# Patient Record
(demographics unavailable — no encounter records)

---

## 2018-04-02 PROCEDURE — 82746 ASSAY OF FOLIC ACID SERUM: CPT | Performed by: FAMILY MEDICINE

## 2018-04-02 PROCEDURE — 82607 VITAMIN B-12: CPT | Performed by: FAMILY MEDICINE

## 2024-02-22 NOTE — PATIENT INSTRUCTIONS
After your visit at the [place] office  today, please direct any follow up questions or medication needs to the staff in our [place] office so that your concerns may be promptly addressed.  We are available through [employer] or at the numbers below:    The phone number is:   [phone], option 1    The fax number is:  [phone]    Your pharmacy should also send any requests electronically to the [place] office.       Refill policies:    Allow 2-3 business days for refills; controlled substances may take longer.  Contact your pharmacy at least 5 days prior to running out of medication and have them send an electronic request or submit request through the “request refill” option in your [employer] account.  Refills are not addressed on weekends; covering physicians do not authorize routine medications on weekends.  No narcotics or controlled substances are refilled after noon on Fridays or by on call physicians.  By law, narcotics must be electronically prescribed.  A 30 day supply with no refills is the maximum allowed.  If your prescription is due for a refill, you may be due for a follow up appointment.  To best provide you care, patients receiving routine medications need to be seen at least once a year.  Patients receiving narcotic/controlled substance medications need to be seen at least once every 3 months.  In the event that your preferred pharmacy does not have the requested medication in stock (e.g. Backordered), it is your responsibility to find another pharmacy that has the requested medication available.  We will gladly send a new prescription to that pharmacy at your request.    Scheduling Tests:    If your physician has ordered radiology tests such as MRI or CT scans, please contact Central Scheduling at [phone] right away to schedule the test.  Once scheduled, the [facility] Centralized Referral Team will work with your insurance carrier to obtain pre-certification or prior authorization.   Depending on your insurance carrier, approval may take 3-10 days.  It is highly recommended patients assure they have received an authorization before having a test performed.  If test is done without insurance authorization, patient may be responsible for the entire amount billed.      Precertification and Prior Authorizations:  If your physician has recommended that you have a procedure or additional testing performed the [facility] Centralized Referral Team will contact your insurance carrier to obtain pre-certification or prior authorization.    You are strongly encouraged to contact your insurance carrier to verify that your procedure/test has been approved and is a COVERED benefit.  Although the [facility] Centralized Referral Team does its due diligence, the insurance carrier gives the disclaimer that \"Although the procedure is authorized, this does not guarantee payment.\"    Ultimately the patient is responsible for payment.   Thank you for your understanding in this matter.  Paperwork Completion:  If you require FMLA or disability paperwork for your recovery, please make sure to either drop it off or have it faxed to our office at [phone]. Be sure the form has your name and date of birth on it.  The form will be faxed to our Forms Department and they will complete it for you.  There is a 25$ fee for all forms that need to be filled out.  Please be aware there is a 10-14 day turnaround time.  You will need to sign a release of information (SUSAN) form if your paperwork does not come with one.  You may call the Forms Department with any questions at [phone].  Their fax number is [phone].

## 2024-02-22 NOTE — PROGRESS NOTES
HPI:    Patient ID: Elif Varner is a 50 year old female.  PCP: Jerica Beck NP    HPI  Patient is a 50-year-old female with no significant past medical history presented for evaluation of for an MRI brain finding of a possible remote ischemia.  She reports she had an episode of blurred vision involving both eyes that lasted for about 1 to 2 hours without any associated eye pain or headache.  She was evaluated by her primary care and MRI of the brain and ophthalmology evaluation was recommended.  MRI of the brain done at [facility] shows a small area of clinical she has no known history of any TIAs or stroke.  States gets her migraines occasionally and has had migraines with visual aura, usually gets flashing lights  At baseline has decrease vision in left eye from previous pterygium surgery. Sees Ophthalmology and report from 12/8 shows early cataract both eyes and dry eye syndrome both eyes.  Remote history of head injury as a child and ? Brain imaging did shows some dried blood what she recalls from her parents.         HISTORY:  History reviewed. No pertinent past medical history.   Past Surgical History:   Procedure Laterality Date    OTHER      endometriosis      Family History   Problem Relation Age of Onset    Diabetes Father     Other (Other) Mother         hypotension    Psychiatric Daughter         depression    Cancer Maternal Grandmother         stomach ca.      Social History     Socioeconomic History    Marital status:    Tobacco Use    Smoking status: Never    Smokeless tobacco: Never   Vaping Use    Vaping Use: Never used   Substance and Sexual Activity    Alcohol use: Never    Drug use: Never   Other Topics Concern    Caffeine Concern Yes     Comment: 1 cupcoffee/day    Exercise No   Social History Narrative    ** Merged History Encounter **             Review of Systems   Constitutional: Negative.    HENT: Negative.     Eyes: Negative.    Respiratory: Negative.      Cardiovascular: Negative.    Gastrointestinal: Negative.    Endocrine: Negative.    Genitourinary: Negative.    Musculoskeletal: Negative.    Allergic/Immunologic: Negative.    Neurological: Negative.         + history of migraine   Hematological: Negative.    Psychiatric/Behavioral: Negative.     All other systems reviewed and are negative.           Current Outpatient Medications   Medication Sig Dispense Refill    EPINEPHrine 0.3 MG/0.3ML Injection Solution Auto-injector Inject 0.3 mL (1 each total) as directed As Directed.      Cholecalciferol (VITAMIN D) 50 MCG (2000 UT) Oral Cap Take 1 capsule (2,000 Units total) by mouth daily.      ferrous sulfate 325 (65 FE) MG Oral Tab EC Take 1 tablet (325 mg total) by mouth daily with breakfast.       Allergies:  Allergies   Allergen Reactions    Latex ITCHING    Sulfa Antibiotics ANAPHYLAXIS    Sulfa Antibiotics SHORTNESS OF BREATH     PHYSICAL EXAM:   Physical Exam  Blood pressure 106/60, pulse 68, resp. rate 16, weight 154 lb (69.9 kg).  General Appearance: Well nourished, well developed, no apparent distress.     HEENT: Normocephalic and atraumatic. Normal sclera. Moist mucus membrane  Neck: Normal range of motion. Neck supple.  Cardiovascular: Normal rate, regular rhythm and normal heart sounds.    Pulmonary/Chest: Effort normal and breath sounds normal.   Abdominal: Soft. Bowel sounds are normal.   Skin: dry, clean and intact  Ext: peripheral pulses present  Psych: normal mood and affect    Neurological:  Patient is awake, alert and oriented to person, place and time   Normal memory, attention/concentration, speech and language.    Cranial Nerves:   II: Visual acuity: normal with glasses  VF: intact by confrontation    III: Pupils: equal, round, reactive to light  III,IV,VI: Extra Ocular Movements: intact  V: Facial sensation: intact  VII: Facial strength: intact  VIII: Hearing: intact  IX: Palate: intact  XI: Shoulder shrug: intact  XII: Tongue movement:  normal    Motor: Normal tone. Strength is  5 out of 5 in all extremities bilaterally.  DTR: present    Sensory: Sensory examination is normal to light touch and pinprick     Coordination: Finger-to-nose test normal bilaterally without evidence of dysmetria.    Gait: Casual, toe, heel, and tandem gait are normal.       TESTS/IMAGING:     MRI brain w and wo contrast ( [facility])  No report available      ASSESSMENT/PLAN:       ICD-10-CM    1. Old lacunar stroke without late effect  Z86.73         Patient is a 50 year old female with no known vascular vascular risk factors presented for evaluation with an MRI finding of possible small remote area of ischemia in the right subinsular area by report    Discussed with the patient the finding is incidental and non specific, may represent a old lacunar stroke vs small venous anomaly given blood vessel in the vicinity  Obtained and the official report from [facility]    Start aspirin 81 mg for stroke prevention  Follow-up with PCP for hyperlipidemia     Follow-up as needed      Thank you for allowing us to participate in your patient's care.    Total 45 minutes spent with patient >50% of visit was spent in counseling and coordination of care      Arie Whitt MD  [facility]          Meds This Visit:  Requested Prescriptions      No prescriptions requested or ordered in this encounter       Imaging & Referrals:  None     ID#[id number]

## 2024-02-22 NOTE — TELEPHONE ENCOUNTER
Rec'd written report for MRI  Brain, dos 11/17/2023. Endorsed to nurse's bin for provider review.

## 2024-02-22 NOTE — PROGRESS NOTES
Patient states she had an abnormal MRI States had episode of blurredvision 2 months ago that lasted for 2 hours

## 2025-01-28 NOTE — TELEPHONE ENCOUNTER
Scheduled for:  Colonoscopy 39344  Provider Name:   Mcbride  Date:  3/22/2025  Location:  [facility]  Sedation:  MAC  Time:  8:30 am. (pt is aware that ENDO will call the day before the procedure to confirm arrival time)  Prep:  Miralax/Gatorade  Meds/Allergies Reconciled?:  Physician Reviewed  Diagnosis with codes:    CRC screening Z12.11  Was patient informed to call insurance with codes (Y/N):  Yes  Referral sent?:  Referral was sent at the time of electronic surgical scheduling.  [facility] or [facility] notified?:  I sent an electronic request to Endo Scheduling and received a confirmation today.  Medication Orders:  Patient is aware to NOT take iron pills, herbal meds and diet supplements for 7 days before exam. Also to NOT take any form of alcohol, recreational drugs and any forms of ED meds 24-72 hours before exam.   Misc Orders:  N/A   Further instructions given by staff:  I discussed the prep instructions with the patient which she verbally understood. I provided patient with prep instruction's sheet in office.      Patient was informed about the new cancellation policy for his/her procedure. Patient was also given a copy of the cancellation policy at the time of the appointment and verbalized understanding.

## 2025-01-28 NOTE — PATIENT INSTRUCTIONS
1. Schedule colonoscopy with MAC [Diagnosis: colon cancer screening]    2.  bowel prep from pharmacy (split dose miralax/gatorade + dulcolax tablets starting 3 days before the procedures)    3. Continue all medications for procedure    4. Read all bowel prep instructions carefully    5. AVOID seeds, nuts, popcorn, raw fruits and vegetables (cooked is okay) for 2-3 days before procedure    6. If you start any NEW medication after your visit today, please notify us. Certain medications will need to be held before the procedure, or the procedure cannot be performed.

## 2025-01-28 NOTE — H&P
[facility] - Gastroenterology                                                                                                  Clinic History and Physical       Reason for consult: colon cancer screening    Requesting physician or provider: Dino Sultana DO    HPI:   Elif Varner is a 51 year old year-old female who presents for colon cancer screening evaluation.    Patient denies any GI symptoms of nausea, vomiting, dyspepsia, dysphagia, hematemesis, abdominal pain, change in bowel habits, thin stools, hematochezia, or melena.  Additionally there is no weight loss and no reported history of chest pain or shortness of breath.  -no family history of colon cancer.  -no significant constipation issues.    Prior endoscopies:  none    Soc:  -No smoking  -occasional Etoh    History, Medications, Allergies, ROS:      History reviewed. No pertinent past medical history.   Past Surgical History:   Procedure Laterality Date    Other      endometriosis      Family Hx:   Family History   Problem Relation Age of Onset    Diabetes Father     Other (Other) Mother         hypotension    Psychiatric Daughter         depression    Cancer Maternal Grandmother         stomach ca.      Social History:   Social History     Socioeconomic History    Marital status:    Tobacco Use    Smoking status: Never    Smokeless tobacco: Never   Vaping Use    Vaping status: Never Used   Substance and Sexual Activity    Alcohol use: Never    Drug use: Never   Other Topics Concern    Caffeine Concern Yes     Comment: 1 cupcoffee/day    Exercise No   Social History Narrative    ** Merged History Encounter **          Social Drivers of Health      [facility]        Medications (Active prior to today's visit):  Current Outpatient Medications   Medication Sig Dispense Refill    multivitamin Oral Chew Tab Chew 1 tablet by mouth daily.      EPINEPHrine 0.3 MG/0.3ML Injection Solution  Auto-injector Inject 0.3 mL (1 each total) as directed As Directed.      Cholecalciferol (VITAMIN D) 50 MCG (2000 UT) Oral Cap Take 1 capsule (2,000 Units total) by mouth daily.      ferrous sulfate 325 (65 FE) MG Oral Tab EC Take 1 tablet (325 mg total) by mouth daily with breakfast. (Patient not taking: Reported on 1/28/2025)         Allergies:  Allergies[1]    ROS:   CONSTITUTIONAL:  negative for fevers, rigors  EYES:  negative for diplopia   RESPIRATORY:  negative for severe shortness of breath  CARDIOVASCULAR:  negative for crushing sub-sternal chest pain  GASTROINTESTINAL:  see HPI  GENITOURINARY:  negative for dysuria or gross hematuria  INTEGUMENT/BREAST:  SKIN:  negative for jaundice   ALLERGIC/IMMUNOLOGIC:  negative for hay fever  ENDOCRINE:  negative for cold intolerance and heat intolerance  MUSCULOSKELETAL:  negative for joint effusion/severe erythema  BEHAVIOR/PSYCH:  negative for psychotic behavior      PHYSICAL EXAM:   Blood pressure 106/68, pulse 76, height 5' 5\" (1.651 m), weight 164 lb (74.4 kg), last menstrual period 01/27/2025.    Gen- Patient appears comfortable and in no acute discomfort  HEENT: the sclera appears anicteric, oropharynx clear, mucus membranes appear moist  CV- regular rate and rhythm, the extremities are warm and well perfused   Lung- Moves air well; No labored breathing  Abdomen- soft, non-distended  Skin- No jaundice  Ext: no edema is evident.   Neuro- Alert and interactive, and gross movements of extremities normal    Labs/Imaging:     Patient's labs and imaging were reviewed and discussed with patient today.      ASSESSMENT/PLAN:   Elif Varner is a 51 year old year-old female who presents for colon cancer screening evaluation. No anemia noted on lab work.    # Average Risk screening: patient is considered average risk for colon cancer (no family hx of colon cancer) and it is appropriate to proceed with screening colonoscopy. Patient is currently asymptomatic  and denies diarrhea, hematochezia, thin-stools or weight loss. We discussed risks/benefits/alternatives to procedure, including CT colonography and stool testing, they want to proceed with colonoscopy.    Recommend:  -Schedule colonoscopy w/ MAC  -Prep: split dose gatorade/miralax with dulcolax x 3 days.  -Anti-platelets and anti-coagulants: n/a  -Diabetes and hypertensive meds: n/a    Colonoscopy consent: I have discussed the risks, benefits, and alternatives to colonoscopy with the patient [who demonstrated understanding], including but not limited to the risks of bleeding, infection, pain, as well as the risks of anesthesia and perforation all leading to prolonged hospitalization, surgical intervention. I also specifically mentioned the miss rate of colonoscopy of 5-10% in the best of all circumstances. All questions were answered to the patient’s satisfaction. The patient elected to proceed with colonoscopy with intervention [i.e. polypectomy, etc.] as indicated.    Orders This Visit:  No orders of the defined types were placed in this encounter.      Meds This Visit:  Requested Prescriptions      No prescriptions requested or ordered in this encounter       Imaging & Referrals:  None       Pepe Mcbride MD  [facility]  1/28/2025           [1]   Allergies  Allergen Reactions    Latex ITCHING    Shellfish-Derived Products ANAPHYLAXIS    Sulfa Antibiotics ANAPHYLAXIS    Sulfa Antibiotics SHORTNESS OF BREATH

## 2025-03-11 NOTE — TELEPHONE ENCOUNTER
GI RNS-   I spoke with patients daughter Robina. Patient is planning on getting Covid and Flu shots today. Patient is also planning on getting Hep A & B and MMR vaccines possibly prior to her procedure on 3/22/25. Please reach out to patients daughter and advise if this is ok.   Thank you!!

## 2025-03-12 NOTE — TELEPHONE ENCOUNTER
I spoke to the pt's daughter Robina    I advised he to have her mom wait until after her colonoscopy for the remainder of her vaccines.    If for some reason she had an adverse reaction to the vaccines it could delay her colonoscopy and she might have to reschedule. It could take months to get back on the schedule     Daughter appreciative of the call

## 2025-03-18 NOTE — TELEPHONE ENCOUNTER
Patient's daughter states that the patient has come down with a cold today, so she wants to know if the patient is still able to get her procedure done on 3/22/2025? Daughter states that the patient has a runny nose, headache, cough and sore throat. Daughter states that the patient does not have a fever.

## 2025-03-18 NOTE — TELEPHONE ENCOUNTER
GI Schedulers    I spoke to Lydia in Endoscopy and she said the pt needs to reschedule    I spoke to the pt's daughter Robina. She is aware we will cancel her mother's procedure scheduled on 03/21/2025    Please call daughter to reschedule her mom's procedure